# Patient Record
Sex: FEMALE | Race: WHITE
[De-identification: names, ages, dates, MRNs, and addresses within clinical notes are randomized per-mention and may not be internally consistent; named-entity substitution may affect disease eponyms.]

---

## 2019-06-19 ENCOUNTER — HOSPITAL ENCOUNTER (EMERGENCY)
Dept: HOSPITAL 74 - JER | Age: 54
Discharge: HOME | End: 2019-06-19
Payer: COMMERCIAL

## 2019-06-19 VITALS — TEMPERATURE: 98.3 F

## 2019-06-19 VITALS — BODY MASS INDEX: 34.3 KG/M2

## 2019-06-19 VITALS — SYSTOLIC BLOOD PRESSURE: 140 MMHG | DIASTOLIC BLOOD PRESSURE: 70 MMHG | HEART RATE: 60 BPM

## 2019-06-19 DIAGNOSIS — R07.9: Primary | ICD-10-CM

## 2019-06-19 LAB
ALBUMIN SERPL-MCNC: 3.4 G/DL (ref 3.4–5)
ALP SERPL-CCNC: 76 U/L (ref 45–117)
ALT SERPL-CCNC: 24 U/L (ref 13–61)
ANION GAP SERPL CALC-SCNC: 4 MMOL/L (ref 8–16)
APPEARANCE UR: CLEAR
AST SERPL-CCNC: 18 U/L (ref 15–37)
BASOPHILS # BLD: 0.7 % (ref 0–2)
BILIRUB SERPL-MCNC: 0.6 MG/DL (ref 0.2–1)
BILIRUB UR STRIP.AUTO-MCNC: NEGATIVE MG/DL
BUN SERPL-MCNC: 11.8 MG/DL (ref 7–18)
CALCIUM SERPL-MCNC: 8.8 MG/DL (ref 8.5–10.1)
CHLORIDE SERPL-SCNC: 108 MMOL/L (ref 98–107)
CO2 SERPL-SCNC: 31 MMOL/L (ref 21–32)
COLOR UR: YELLOW
CREAT SERPL-MCNC: 0.9 MG/DL (ref 0.55–1.3)
DEPRECATED RDW RBC AUTO: 15 % (ref 11.6–15.6)
EOSINOPHIL # BLD: 1.6 % (ref 0–4.5)
GLUCOSE SERPL-MCNC: 101 MG/DL (ref 74–106)
HCT VFR BLD CALC: 37.8 % (ref 32.4–45.2)
HGB BLD-MCNC: 12.6 GM/DL (ref 10.7–15.3)
KETONES UR QL STRIP: NEGATIVE
LEUKOCYTE ESTERASE UR QL STRIP.AUTO: NEGATIVE
LIPASE SERPL-CCNC: 243 U/L (ref 73–393)
LYMPHOCYTES # BLD: 32.8 % (ref 8–40)
MCH RBC QN AUTO: 29.8 PG (ref 25.7–33.7)
MCHC RBC AUTO-ENTMCNC: 33.4 G/DL (ref 32–36)
MCV RBC: 89.3 FL (ref 80–96)
MONOCYTES # BLD AUTO: 7.5 % (ref 3.8–10.2)
NEUTROPHILS # BLD: 57.4 % (ref 42.8–82.8)
NITRITE UR QL STRIP: NEGATIVE
PH UR: 6.5 [PH] (ref 5–8)
PLATELET # BLD AUTO: 264 K/MM3 (ref 134–434)
PMV BLD: 8.4 FL (ref 7.5–11.1)
POTASSIUM SERPLBLD-SCNC: 4.4 MMOL/L (ref 3.5–5.1)
PROT SERPL-MCNC: 6.7 G/DL (ref 6.4–8.2)
PROT UR QL STRIP: NEGATIVE
PROT UR QL STRIP: NEGATIVE
RBC # BLD AUTO: 4.24 M/MM3 (ref 3.6–5.2)
SODIUM SERPL-SCNC: 143 MMOL/L (ref 136–145)
SP GR UR: 1.01 (ref 1.01–1.03)
UROBILINOGEN UR STRIP-MCNC: 1 MG/DL (ref 0.2–1)
WBC # BLD AUTO: 5.7 K/MM3 (ref 4–10)

## 2019-06-19 NOTE — PDOC
History of Present Illness





- General


Chief Complaint: Lightheaded


Stated Complaint: SENT BY PCP/CHEST PAIN


Time Seen by Provider: 06/19/19 15:52


History Source: Patient


Exam Limitations: No Limitations





- History of Present Illness


Initial Comments: 








55 yo F w a hx of HCL presents to the ER with 2 days of on and off substernal 

chest pain described as a sharp sensation which is worsened when the patient 

takes a deep breath. The pain first came on yesterday as the patient was 

working as a  and sweeping floors. The pain was 7/10 in intensity at its 

worst. The pain has remained relatively constant since yesterday whenever she 

takes a deep breath. It is not associated with any nausea, vomting, diaphoresis

, cough, hemoptysis, or radiation to the back. The patient denies any hx of a 

blood clot. Denies taking any estrogen supplements. The patient denies recent 

travel/surgeries/immobility, lower extremity edema, calf pain or tenderness, 

tobacco use, personal or family history of thrombosis.








PCP: Dr. Miller


PSH: left Shoulder sx


Allergies: Seasonal, NKDA


Social Hx: Drinks recreationally. Denies smoking or other substance usage. 














Past History





- Past Medical History


Allergies/Adverse Reactions: 


 Allergies











Allergy/AdvReac Type Severity Reaction Status Date / Time


 


No Known Allergies Allergy   Verified 01/11/14 20:12











Home Medications: 


Ambulatory Orders





Atorvastatin Ca [Lipitor -] 20 mg PO DAILY 01/11/14 








Asthma:  (SEASONALLY)


COPD: No


Hypercholesterolemia: Yes





- Immunization History


Immunization Up to Date: No





- Suicide/Smoking/Psychosocial Hx


Smoking History: Never smoked


Have you smoked in the past 12 months: No


Number of Cigarettes Smoked Daily: 0


Information on smoking cessation initiated: No


Hx Alcohol Use: No


Drug/Substance Use Hx: No


Substance Use Type: None





**Review of Systems





- Review of Systems


Able to Perform ROS?: Yes


Comments:: 








CONSTITUTIONAL:


Absent: fever, no chills, no fatigue


EYES:


Absent: visual changes


ENT:


Absent: ear pain, no sore throat


CARDIOVASCULAR:


Present: chest pain


Absent: no palpitations


RESPIRATORY:


Absent: cough, no SOB


GI:


Absent: abdominal pain, no nausea, no vomiting, no constipation, no diarrhea


GENITOURINARY:


Absent: dysuria, no frequency, no hematuria


MUSKULOSKELETAL:


Absent: back pain, no arthralgia, no myalgia


SKIN:


Absent: rash


NEURO:


Absent: headache














*Physical Exam





- Vital Signs


 Last Vital Signs











Temp Pulse Resp BP Pulse Ox


 


 98.3 F   62   16   162/79   100 


 


 06/19/19 15:53  06/19/19 15:53  06/19/19 15:53  06/19/19 15:53  06/19/19 15:53














- Physical Exam


Comments: 








GENERAL:


Well-appearing, well-nourished. No apparent distress.


HEENT:


Normocephalic, atraumatic. PERRL, EOM intact.


CARDIOVASCULAR:


Normal S1, S2. Regular rate and rhythm.


PULMONARY:


No evidence of respiratory distress. Lungs clear to auscultation bilaterally. 

No wheezing, rales or rhonchi.


ABDOMEN:


Soft, non-distended, non-tender.


EXTREMITIES:


Normal ROM in all four extremities. No gross deformities. Negative kalie sign b/

l. 


SKIN:


Warm, dry.  No rash


NEUROLOGICAL:


No focal neurological deficits.











ED Treatment Course





- LABORATORY


CBC & Chemistry Diagram: 


 06/19/19 15:54





 06/19/19 16:55





Medical Decision Making





- Medical Decision Making





55 yo F w a hx of HCL presents to the ER with 2 days of on and off substernal 

chest pain described as a sharp sensation which is worsened when the patient 

takes a deep breath. The pain first came on yesterday as the patient was 

working as a  and sweeping floors. The pain was 7/10 in intensity at its 

worst. The pain has remained relatively constant since yesterday whenever she 

takes a deep breath. It is not associated with any nausea, vomting, diaphoresis

, cough, hemoptysis, or radiation to the back. The patient denies any hx of a 

blood clot. Denies taking any estrogen supplements. The patient denies recent 

travel/surgeries/immobility, lower extremity edema, calf pain or tenderness, 

tobacco use, personal or family history of thrombosis.





Vital Signs











Temp Pulse Resp BP Pulse Ox


 


 98.3 F   62   16   162/79   100 


 


 06/19/19 15:53  06/19/19 15:53  06/19/19 15:53  06/19/19 15:53  06/19/19 15:53








DDx IBNLT: ACS/MI, angina, pneumothorax, PE, costochondritis, PNA, electrolyte/

metabolic disturbance. 





Plan: Labs, Urine, EKG, CXR, analegsia, re-assess. 





D-Dimer elevated. Will obtain CTA to r/o PE





CTA negative for acute chest pathology.





Tropx2 negative


EKG not suggestive of ACS. 





Will send patient home with cardiac referal














*DC/Admit/Observation/Transfer


Diagnosis at time of Disposition: 


 Chest pain








- Discharge Dispostion


Disposition: HOME


Condition at time of disposition: Stable


Decision to Admit order: No





- Referrals


Referrals: 


Johann Smith MD [Staff Physician] - 





- Patient Instructions


Printed Discharge Instructions:  DI for Atypical Chest Pain, DI for Chest Pain


Additional Instructions: 


You came into the ER with chest pain. We did a cat scan of your chest which 

showed you don't have a blood clot. We also looked at your blood and did an 

electrocardiogram and know you did not have a heart attack. 





Please make sure to schedule an appointment with the cardiologist we are 

referring you to as an outpatient. 





Come back to the ER immediately if your pain worsens or you have any other new 

or worsening concerns. 





Thank you for coming to the [facility] ER. We hope you feel better soon! 


Print Language: ENGLISH





- Post Discharge Activity

## 2019-06-19 NOTE — PDOC
Rapid Medical Evaluation


Chief Complaint: Chest Pain


Time Seen by Provider: 06/19/19 15:52


Medical Evaluation: 


 Allergies











Allergy/AdvReac Type Severity Reaction Status Date / Time


 


No Known Allergies Allergy   Verified 01/11/14 20:12











06/19/19 15:53


I have performed a brief in-person evaluation of this patient. 


The patient presents with a chief complaint of: intermittant CP / dizzy x 2 

days , sent from 


Pertinent physical exam findings: pale, no sob 


I have ordered the following: EKG, Labs/ 


The patient will proceed to the ED for further evaluation.


06/19/19 15:55








**Discharge Disposition





- Diagnosis


 Chest pain








- Referrals





- Patient Instructions





- Post Discharge Activity

## 2019-06-20 NOTE — EKG
Test Reason : 

Blood Pressure : ***/*** mmHG

Vent. Rate : 063 BPM     Atrial Rate : 063 BPM

   P-R Int : 154 ms          QRS Dur : 090 ms

    QT Int : 420 ms       P-R-T Axes : 066 040 053 degrees

   QTc Int : 429 ms

 

NORMAL SINUS RHYTHM

NORMAL ECG

NO PREVIOUS ECGS AVAILABLE

Confirmed by KRISTIAN OSORIO MD ([id number]) on 6/20/2019 4:30:49 PM

 

Referred By:             Confirmed By:KRISTIAN OSORIO MD

## 2019-07-16 ENCOUNTER — HOSPITAL ENCOUNTER (EMERGENCY)
Dept: HOSPITAL 74 - JERFT | Age: 54
Discharge: HOME | End: 2019-07-16
Payer: COMMERCIAL

## 2019-07-16 VITALS — TEMPERATURE: 97.3 F | SYSTOLIC BLOOD PRESSURE: 137 MMHG | DIASTOLIC BLOOD PRESSURE: 75 MMHG | HEART RATE: 61 BPM

## 2019-07-16 VITALS — BODY MASS INDEX: 33.3 KG/M2
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
## 2019-07-16 DIAGNOSIS — M54.5: Primary | ICD-10-CM
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
## 2019-07-16 NOTE — PDOC
History of Present Illness





- General


Chief Complaint: Back Pain


Stated Complaint: BACK PAIN


Time Seen by Provider: 07/16/19 08:15


History Source: Patient


Exam Limitations: No Limitations





Past History





- Past Medical History


Allergies/Adverse Reactions: 


 Allergies











Allergy/AdvReac Type Severity Reaction Status Date / Time


 


No Known Allergies Allergy   Verified 07/16/19 08:10











Home Medications: 


Ambulatory Orders





Atorvastatin Ca [Lipitor -] 20 mg PO DAILY 01/11/14 


Cyclobenzaprine HCl [Flexeril 10 mg] 10 mg PO TID PRN #21 tablet 07/16/19 


Lidocaine 5% Patch [Lidoderm -] 1 patch TP DAILY #7 patch 07/16/19 








Asthma:  (SEASONALLY)


COPD: No


Hypercholesterolemia: Yes





- Immunization History


Immunization Up to Date: No





- Suicide/Smoking/Psychosocial Hx


Smoking History: Never smoked


Have you smoked in the past 12 months: No


Number of Cigarettes Smoked Daily: 0


Information on smoking cessation initiated: No


Hx Alcohol Use: No


Drug/Substance Use Hx: No


Substance Use Type: None





*Physical Exam





- Vital Signs


 Last Vital Signs











Temp Pulse Resp BP Pulse Ox


 


 97.3 F L  61   17   137/75   100 


 


 07/16/19 08:10  07/16/19 08:10  07/16/19 08:10  07/16/19 08:10  07/16/19 08:10














- Physical Exam


General Appearance: No: Apparent Distress


Respiratory/Chest: positive: Lungs Clear, Normal Breath Sounds.  negative: 

Respiratory Distress


Cardiovascular: positive: Regular Rhythm, Regular Rate, S1, S2.  negative: 

Murmur


Gastrointestinal/Abdominal: positive: Normal Bowel Sounds, Soft.  negative: 

Tender, Distended, Guarding, Rebound


Musculoskeletal: positive: Muscle Spasm, Other (+R lumbar paravertebral muscle 

tenderness).  negative: Vertebral Tenderness


Neurologic: positive: CNs II-XII NML intact, Fully Oriented, Alert, Normal Mood/

Affect, Motor Strength 5/5





Medical Decision Making





- Medical Decision Making








55 y/o F with hx of HLD presents with nonradiating LBP x 3 weeks, worsening 

today. Back pain initially started after doing some heavy work and became worse 

after she again moved some heavy furniture yesterday. Has been taking Motrin 

for pain. Did not take any pain meds today. Denies fever, sob, cp, abd pain, n/v

/d, urinary problems, bowel/bladder incontinence, saddle/groin paresthesia. 





Likely muscle strain


Plan: Lidocaine patch, motrin, flexeril





07/16/19 08:26








*DC/Admit/Observation/Transfer


Diagnosis at time of Disposition: 


 Lumbar back pain








- Discharge Dispostion


Disposition: HOME


Condition at time of disposition: Stable


Decision to Admit order: No





- Prescriptions


Prescriptions: 


Cyclobenzaprine HCl [Flexeril 10 mg] 10 mg PO TID PRN #21 tablet


 PRN Reason: Muscle Spasms


Lidocaine 5% Patch [Lidoderm -] 1 patch TP DAILY #7 patch





- Referrals





- Patient Instructions


Printed Discharge Instructions:  DI for Low Back Pain


Additional Instructions: 





Thank you for choosing [facility].  It was a pleasure taking 

care of you.  





You may take  Motrin 600 mg every 6 hours by mouth as needed for mild to 

moderate pain.  Take Motrin with food. 


Take Flexeril as needed for muscle spasms. This medication can also make you 

drowsy so please be cautious with driving or performing heavy physical work. 

Apply warm compresses to back


Follow-up with your doctor in 2 days





Return to the Emergency Department if your symptoms worsen or persist, you have 

fever, shortness of breath, chest pain, severe abdominal pain, vomiting, 

weakness of extremities, unable to weak, unable to control bowel or bladder 

movements or other concerning symptoms. 





- Post Discharge Activity